# Patient Record
Sex: FEMALE | Race: BLACK OR AFRICAN AMERICAN | NOT HISPANIC OR LATINO | URBAN - METROPOLITAN AREA
[De-identification: names, ages, dates, MRNs, and addresses within clinical notes are randomized per-mention and may not be internally consistent; named-entity substitution may affect disease eponyms.]

---

## 2022-04-14 ENCOUNTER — OUT OF OFFICE VISIT (OUTPATIENT)
Dept: URBAN - METROPOLITAN AREA MEDICAL CENTER 16 | Facility: MEDICAL CENTER | Age: 47
End: 2022-04-14
Payer: COMMERCIAL

## 2022-04-14 DIAGNOSIS — Z87.11 H/O GASTRIC ULCER: ICD-10-CM

## 2022-04-14 DIAGNOSIS — R10.13 ABDOMINAL DISCOMFORT, EPIGASTRIC: ICD-10-CM

## 2022-04-14 DIAGNOSIS — R11.2 ACUTE NAUSEA WITH NONBILIOUS VOMITING: ICD-10-CM

## 2022-04-14 DIAGNOSIS — D50.8 ACQUIRED IRON DEFICIENCY ANEMIA DUE TO DECREASED ABSORPTION: ICD-10-CM

## 2022-04-14 PROCEDURE — G8427 DOCREV CUR MEDS BY ELIG CLIN: HCPCS | Performed by: INTERNAL MEDICINE

## 2022-04-14 PROCEDURE — 99222 1ST HOSP IP/OBS MODERATE 55: CPT | Performed by: INTERNAL MEDICINE

## 2022-04-15 ENCOUNTER — OUT OF OFFICE VISIT (OUTPATIENT)
Dept: URBAN - METROPOLITAN AREA MEDICAL CENTER 16 | Facility: MEDICAL CENTER | Age: 47
End: 2022-04-15
Payer: COMMERCIAL

## 2022-04-15 DIAGNOSIS — K29.30 CHRONIC SUPERFICIAL GASTRITIS: ICD-10-CM

## 2022-04-15 DIAGNOSIS — D50.9 ANEMIA: ICD-10-CM

## 2022-04-15 PROCEDURE — 43239 EGD BIOPSY SINGLE/MULTIPLE: CPT | Performed by: INTERNAL MEDICINE

## 2022-04-15 PROCEDURE — 45378 DIAGNOSTIC COLONOSCOPY: CPT | Performed by: INTERNAL MEDICINE

## 2022-04-28 ENCOUNTER — WEB ENCOUNTER (OUTPATIENT)
Dept: URBAN - METROPOLITAN AREA CLINIC 84 | Facility: CLINIC | Age: 47
End: 2022-04-28

## 2022-05-04 ENCOUNTER — OFFICE VISIT (OUTPATIENT)
Dept: URBAN - METROPOLITAN AREA CLINIC 84 | Facility: CLINIC | Age: 47
End: 2022-05-04

## 2022-05-04 ENCOUNTER — WEB ENCOUNTER (OUTPATIENT)
Dept: URBAN - METROPOLITAN AREA CLINIC 84 | Facility: CLINIC | Age: 47
End: 2022-05-04

## 2022-05-04 VITALS
HEART RATE: 104 BPM | WEIGHT: 260.8 LBS | HEIGHT: 62 IN | BODY MASS INDEX: 47.99 KG/M2 | DIASTOLIC BLOOD PRESSURE: 81 MMHG | TEMPERATURE: 98.1 F | SYSTOLIC BLOOD PRESSURE: 129 MMHG

## 2022-05-04 PROBLEM — 313436004: Status: ACTIVE | Noted: 2022-05-04

## 2022-05-04 PROBLEM — 55822004: Status: ACTIVE | Noted: 2022-05-04

## 2022-05-04 PROBLEM — 238136002: Status: ACTIVE | Noted: 2022-05-04

## 2022-05-04 PROBLEM — 161800001: Status: ACTIVE | Noted: 2022-05-04

## 2022-05-04 PROBLEM — 428882003: Status: ACTIVE | Noted: 2022-05-04

## 2022-05-04 RX ORDER — GLIPIZIDE 10 MG/1
1 TABLET 30 MINUTES BEFORE BREAKFAST TABLET ORAL ONCE A DAY
Status: ACTIVE | COMMUNITY

## 2022-05-04 RX ORDER — METFORMIN HYDROCHLORIDE 1000 MG/1
1 TABLET WITH A MEAL TABLET, FILM COATED ORAL ONCE A DAY
Status: ACTIVE | COMMUNITY

## 2022-05-04 RX ORDER — TOPIRAMATE 100 MG/1
1 TABLET TABLET, FILM COATED ORAL ONCE A DAY
Status: ACTIVE | COMMUNITY

## 2022-05-04 RX ORDER — VITAMIN A 2400 MCG
1 TABLET CAPSULE ORAL ONCE A DAY
Status: ACTIVE | COMMUNITY

## 2022-05-04 RX ORDER — DOXEPIN 6 MG/1
1 TABLET AT BEDTIME TABLET, FILM COATED ORAL ONCE A DAY
Status: ACTIVE | COMMUNITY

## 2022-05-04 RX ORDER — ATORVASTATIN CALCIUM 10 MG/1
1 TABLET TABLET, FILM COATED ORAL ONCE A DAY
Status: ACTIVE | COMMUNITY

## 2022-05-04 RX ORDER — LAMOTRIGINE 100 MG/1
1 TABLET TABLET ORAL ONCE A DAY
Status: ACTIVE | COMMUNITY

## 2022-05-04 RX ORDER — ASPIRIN 81 MG/1
1 TABLET TABLET, COATED ORAL ONCE A DAY
Status: ACTIVE | COMMUNITY

## 2022-05-04 RX ORDER — SERTRALINE 100 MG/1
1 TABLET TABLET, FILM COATED ORAL ONCE A DAY
Status: ACTIVE | COMMUNITY

## 2022-05-04 RX ORDER — MELOXICAM 15 MG/1
1 TABLET TABLET ORAL ONCE A DAY
Status: DISCONTINUED | COMMUNITY

## 2022-05-04 RX ORDER — ESTRADIOL 0.5 MG/1
1 TABLET TABLET ORAL ONCE A DAY
Status: ACTIVE | COMMUNITY

## 2022-05-04 RX ORDER — CETIRIZINE HYDROCHLORIDE 10 MG/1
1 TABLET TABLET, FILM COATED ORAL ONCE A DAY
Status: ACTIVE | COMMUNITY

## 2022-05-04 RX ORDER — HYDROXYZINE PAMOATE 50 MG/1
1 CAPSULE AS NEEDED CAPSULE ORAL
Status: ACTIVE | COMMUNITY

## 2022-05-04 RX ORDER — OMEPRAZOLE MAGNESIUM 20.6 MG/1
1 TABLET 30 MINUTES BEFORE MORNING MEAL TABLET, DELAYED RELEASE ORAL ONCE A DAY
Qty: 60 | Refills: 1 | OUTPATIENT
Start: 2022-05-04

## 2022-05-05 LAB — HEMOGLOBIN A1C: 6.3

## 2022-05-11 LAB
C DIFFICILE TOXINS A+B, EIA: NEGATIVE
CAMPYLOBACTER CULTURE: (no result)
E COLI SHIGA TOXIN EIA: NEGATIVE
Lab: (no result)
Lab: (no result)
SALMONELLA/SHIGELLA SCREEN: (no result)

## 2022-05-12 ENCOUNTER — TELEPHONE ENCOUNTER (OUTPATIENT)
Dept: URBAN - METROPOLITAN AREA CLINIC 84 | Facility: CLINIC | Age: 47
End: 2022-05-12

## 2022-05-12 RX ORDER — HYDROCORTISONE ACETATE 25 MG/1
1 SUPPOSITORY SUPPOSITORY RECTAL TWICE A DAY
Qty: 28 | Refills: 0 | OUTPATIENT
Start: 2022-05-19 | End: 2022-06-02

## 2022-05-23 ENCOUNTER — CLAIMS CREATED FROM THE CLAIM WINDOW (OUTPATIENT)
Dept: URBAN - METROPOLITAN AREA CLINIC 118 | Facility: CLINIC | Age: 47
End: 2022-05-23
Payer: COMMERCIAL

## 2022-05-23 VITALS
WEIGHT: 265.4 LBS | HEART RATE: 78 BPM | DIASTOLIC BLOOD PRESSURE: 70 MMHG | SYSTOLIC BLOOD PRESSURE: 112 MMHG | HEIGHT: 62 IN | BODY MASS INDEX: 48.84 KG/M2 | TEMPERATURE: 98.1 F

## 2022-05-23 DIAGNOSIS — K64.4 EXTERNAL HEMORRHOID: ICD-10-CM

## 2022-05-23 DIAGNOSIS — K62.5 RECTAL BLEEDING: ICD-10-CM

## 2022-05-23 PROCEDURE — 99213 OFFICE O/P EST LOW 20 MIN: CPT | Performed by: INTERNAL MEDICINE

## 2022-05-23 PROCEDURE — 99203 OFFICE O/P NEW LOW 30 MIN: CPT

## 2022-05-23 PROCEDURE — 99203 OFFICE O/P NEW LOW 30 MIN: CPT | Performed by: INTERNAL MEDICINE

## 2022-05-23 RX ORDER — ATORVASTATIN CALCIUM 10 MG/1
1 TABLET TABLET, FILM COATED ORAL ONCE A DAY
Status: ACTIVE | COMMUNITY

## 2022-05-23 RX ORDER — LAMOTRIGINE 100 MG/1
1 TABLET TABLET ORAL ONCE A DAY
Status: ACTIVE | COMMUNITY

## 2022-05-23 RX ORDER — HYDROCORTISONE ACETATE 25 MG/1
1 SUPPOSITORY SUPPOSITORY RECTAL TWICE A DAY
Qty: 28 | Refills: 0 | Status: ACTIVE | COMMUNITY
Start: 2022-05-19 | End: 2022-06-02

## 2022-05-23 RX ORDER — TOPIRAMATE 100 MG/1
1 TABLET TABLET, FILM COATED ORAL ONCE A DAY
Status: ACTIVE | COMMUNITY

## 2022-05-23 RX ORDER — ESTRADIOL 0.5 MG/1
1 TABLET TABLET ORAL ONCE A DAY
Status: ACTIVE | COMMUNITY

## 2022-05-23 RX ORDER — HYDROXYZINE PAMOATE 50 MG/1
1 CAPSULE AS NEEDED CAPSULE ORAL
Status: ON HOLD | COMMUNITY

## 2022-05-23 RX ORDER — DOXEPIN 6 MG/1
1 TABLET AT BEDTIME TABLET, FILM COATED ORAL ONCE A DAY
Status: ACTIVE | COMMUNITY

## 2022-05-23 RX ORDER — VITAMIN A 2400 MCG
1 TABLET CAPSULE ORAL ONCE A DAY
Status: ACTIVE | COMMUNITY

## 2022-05-23 RX ORDER — SERTRALINE 100 MG/1
1 TABLET TABLET, FILM COATED ORAL ONCE A DAY
Status: ACTIVE | COMMUNITY

## 2022-05-23 RX ORDER — OMEPRAZOLE MAGNESIUM 20.6 MG/1
1 TABLET 30 MINUTES BEFORE MORNING MEAL TABLET, DELAYED RELEASE ORAL ONCE A DAY
Qty: 60 | Refills: 1 | Status: ACTIVE | COMMUNITY
Start: 2022-05-04

## 2022-05-23 RX ORDER — METFORMIN HYDROCHLORIDE 1000 MG/1
1 TABLET WITH A MEAL TABLET, FILM COATED ORAL ONCE A DAY
Status: ACTIVE | COMMUNITY

## 2022-05-23 RX ORDER — ASPIRIN 81 MG/1
1 TABLET TABLET, COATED ORAL ONCE A DAY
Status: ACTIVE | COMMUNITY

## 2022-05-23 RX ORDER — GLIPIZIDE 10 MG/1
1 TABLET 30 MINUTES BEFORE BREAKFAST TABLET ORAL ONCE A DAY
Status: ACTIVE | COMMUNITY

## 2022-05-23 RX ORDER — CETIRIZINE HYDROCHLORIDE 10 MG/1
1 TABLET TABLET, FILM COATED ORAL ONCE A DAY
Status: ON HOLD | COMMUNITY

## 2022-05-23 NOTE — HPI-TODAY'S VISIT:
Ms. Cordon is a 47 y/o female who presents today due to rectal bleeding. She states since last Wednesday she experienced 4 episodes with blood on the toilet paper and in the toilet bowl. She states the blood was around the stool and describes it as medium red. She has not seen any blood since Saturday. She typically has 1BM/ days She denies abdominal pain, consitpation, diarrhea, NV, or dysphagia. She was recently admitted to the hospital at the end of April for diarrhea and epigastric pain and they did and EGD/colon which were both normal.

## 2022-05-23 NOTE — PHYSICAL EXAM GASTROINTESTINAL
Abdomen , soft, nontender, nondistended , no guarding or rigidity , no masses palpable , normal bowel sounds , Liver and Spleen , no hepatosplenomegaly present, liver nontender Rectal  Chaperone present external hemorrhoids seen, no blood seen on exam glove

## 2022-05-24 ENCOUNTER — TELEPHONE ENCOUNTER (OUTPATIENT)
Dept: URBAN - METROPOLITAN AREA CLINIC 92 | Facility: CLINIC | Age: 47
End: 2022-05-24

## 2022-05-24 LAB
BASO (ABSOLUTE): 0.1
BASOS: 1
EOS (ABSOLUTE): 0.2
EOS: 2
HEMATOCRIT: 42.1
HEMATOLOGY COMMENTS:: (no result)
HEMOGLOBIN: 12.1
IMMATURE CELLS: (no result)
IMMATURE GRANS (ABS): 0
IMMATURE GRANULOCYTES: 0
LYMPHS (ABSOLUTE): 3.7
LYMPHS: 34
MCH: 21.7
MCHC: 28.7
MCV: 75
MONOCYTES(ABSOLUTE): 0.7
MONOCYTES: 6
NEUTROPHILS (ABSOLUTE): 6.1
NEUTROPHILS: 57
NRBC: (no result)
PLATELETS: 285
RBC: 5.58
RDW: 16.5
WBC: 10.7

## 2022-05-27 ENCOUNTER — OFFICE VISIT (OUTPATIENT)
Dept: URBAN - METROPOLITAN AREA TELEHEALTH 2 | Facility: TELEHEALTH | Age: 47
End: 2022-05-27

## 2022-06-06 ENCOUNTER — TELEPHONE ENCOUNTER (OUTPATIENT)
Dept: URBAN - METROPOLITAN AREA CLINIC 23 | Facility: CLINIC | Age: 47
End: 2022-06-06

## 2022-06-13 ENCOUNTER — OFFICE VISIT (OUTPATIENT)
Dept: URBAN - METROPOLITAN AREA CLINIC 118 | Facility: CLINIC | Age: 47
End: 2022-06-13

## 2022-07-05 ENCOUNTER — DASHBOARD ENCOUNTERS (OUTPATIENT)
Age: 47
End: 2022-07-05

## 2022-07-05 ENCOUNTER — OFFICE VISIT (OUTPATIENT)
Dept: URBAN - METROPOLITAN AREA CLINIC 118 | Facility: CLINIC | Age: 47
End: 2022-07-05
Payer: COMMERCIAL

## 2022-07-05 VITALS
HEIGHT: 62 IN | SYSTOLIC BLOOD PRESSURE: 110 MMHG | TEMPERATURE: 98 F | WEIGHT: 274 LBS | BODY MASS INDEX: 50.42 KG/M2 | DIASTOLIC BLOOD PRESSURE: 66 MMHG | HEART RATE: 71 BPM

## 2022-07-05 DIAGNOSIS — K62.5 RECTAL BLEEDING: ICD-10-CM

## 2022-07-05 PROBLEM — 12063002: Status: ACTIVE | Noted: 2022-07-05

## 2022-07-05 PROCEDURE — 99213 OFFICE O/P EST LOW 20 MIN: CPT | Performed by: INTERNAL MEDICINE

## 2022-07-05 RX ORDER — VITAMIN A 2400 MCG
1 TABLET CAPSULE ORAL ONCE A DAY
Status: ACTIVE | COMMUNITY

## 2022-07-05 RX ORDER — SERTRALINE 100 MG/1
1 TABLET TABLET, FILM COATED ORAL ONCE A DAY
Status: ACTIVE | COMMUNITY

## 2022-07-05 RX ORDER — HYDROXYZINE PAMOATE 50 MG/1
1 CAPSULE AS NEEDED CAPSULE ORAL
Status: ON HOLD | COMMUNITY

## 2022-07-05 RX ORDER — DOXEPIN 6 MG/1
1 TABLET AT BEDTIME TABLET, FILM COATED ORAL ONCE A DAY
Status: ACTIVE | COMMUNITY

## 2022-07-05 RX ORDER — TOPIRAMATE 100 MG/1
1 TABLET TABLET, FILM COATED ORAL ONCE A DAY
Status: ACTIVE | COMMUNITY

## 2022-07-05 RX ORDER — LAMOTRIGINE 100 MG/1
1 TABLET TABLET ORAL ONCE A DAY
Status: ACTIVE | COMMUNITY

## 2022-07-05 RX ORDER — ESTRADIOL 0.5 MG/1
1 TABLET TABLET ORAL ONCE A DAY
Status: ACTIVE | COMMUNITY

## 2022-07-05 RX ORDER — CETIRIZINE HYDROCHLORIDE 10 MG/1
1 TABLET TABLET, FILM COATED ORAL ONCE A DAY
Status: ON HOLD | COMMUNITY

## 2022-07-05 RX ORDER — OMEPRAZOLE MAGNESIUM 20.6 MG/1
1 TABLET 30 MINUTES BEFORE MORNING MEAL TABLET, DELAYED RELEASE ORAL ONCE A DAY
Qty: 60 | Refills: 1 | Status: ACTIVE | COMMUNITY
Start: 2022-05-04

## 2022-07-05 RX ORDER — METFORMIN HYDROCHLORIDE 1000 MG/1
1 TABLET WITH A MEAL TABLET, FILM COATED ORAL ONCE A DAY
Status: ACTIVE | COMMUNITY

## 2022-07-05 RX ORDER — ASPIRIN 81 MG/1
1 TABLET TABLET, COATED ORAL ONCE A DAY
Status: ON HOLD | COMMUNITY

## 2022-07-05 RX ORDER — GLIPIZIDE 10 MG/1
1 TABLET 30 MINUTES BEFORE BREAKFAST TABLET ORAL ONCE A DAY
Status: ACTIVE | COMMUNITY

## 2022-07-05 RX ORDER — ATORVASTATIN CALCIUM 10 MG/1
1 TABLET TABLET, FILM COATED ORAL ONCE A DAY
Status: ACTIVE | COMMUNITY

## 2022-07-05 NOTE — HPI-TODAY'S VISIT:
7/5/22  - 47 yo BF here for episode of rectal bleed.  Pt has been having intermittent BRB on TP and mixed with brown stool.  She had another occurrence on 7/4/22, and went to [facility] ER.  Cellphone picture shows mushy brown stool with large splotch of red blood.  Pt notes BMs qd, no straining.  In [facility] ER, labs were stable, with Hgb = 11.  No rectal pain.  ********************************** 5/23/22 - Ms. Cordon is a 45 y/o female who presents today due to rectal bleeding. She states since last Wednesday she experienced 4 episodes with blood on the toilet paper and in the toilet bowl. She states the blood was around the stool and describes it as medium red. She has not seen any blood since Saturday. She typically has 1BM/ days She denies abdominal pain, consitpation, diarrhea, NV, or dysphagia. She was recently admitted to the hospital at the end of April for diarrhea and epigastric pain and they did and EGD/colon which were both normal.

## 2022-08-17 ENCOUNTER — OUT OF OFFICE VISIT (OUTPATIENT)
Dept: URBAN - METROPOLITAN AREA MEDICAL CENTER 16 | Facility: MEDICAL CENTER | Age: 47
End: 2022-08-17
Payer: COMMERCIAL

## 2022-08-17 DIAGNOSIS — Z93.2 ILEOSTOMY: ICD-10-CM

## 2022-08-17 DIAGNOSIS — K63.1 PERFORATED BOWEL: ICD-10-CM

## 2022-08-17 DIAGNOSIS — D64.89 ANEMIA DUE TO OTHER CAUSE: ICD-10-CM

## 2022-08-17 DIAGNOSIS — K92.2 ACUTE GASTROINTESTINAL BLEEDING: ICD-10-CM

## 2022-08-17 PROCEDURE — 99232 SBSQ HOSP IP/OBS MODERATE 35: CPT | Performed by: INTERNAL MEDICINE

## 2022-08-17 PROCEDURE — 99222 1ST HOSP IP/OBS MODERATE 55: CPT | Performed by: INTERNAL MEDICINE

## 2022-08-17 PROCEDURE — G8427 DOCREV CUR MEDS BY ELIG CLIN: HCPCS | Performed by: INTERNAL MEDICINE

## 2022-09-17 PROBLEM — 301784005 ILEOSTOMY: Status: ACTIVE | Noted: 2022-09-17
